# Patient Record
Sex: MALE | Employment: FULL TIME | URBAN - METROPOLITAN AREA
[De-identification: names, ages, dates, MRNs, and addresses within clinical notes are randomized per-mention and may not be internally consistent; named-entity substitution may affect disease eponyms.]

---

## 2017-07-07 ENCOUNTER — OFFICE VISIT (OUTPATIENT)
Dept: INTERNAL MEDICINE CLINIC | Age: 47
End: 2017-07-07

## 2017-07-07 VITALS
BODY MASS INDEX: 28.28 KG/M2 | HEIGHT: 67 IN | TEMPERATURE: 98.3 F | RESPIRATION RATE: 16 BRPM | SYSTOLIC BLOOD PRESSURE: 136 MMHG | WEIGHT: 180.2 LBS | HEART RATE: 96 BPM | DIASTOLIC BLOOD PRESSURE: 82 MMHG | OXYGEN SATURATION: 99 %

## 2017-07-07 DIAGNOSIS — E78.5 HYPERLIPIDEMIA, UNSPECIFIED HYPERLIPIDEMIA TYPE: ICD-10-CM

## 2017-07-07 DIAGNOSIS — Z80.42 FAMILY HISTORY OF PROSTATE CANCER: ICD-10-CM

## 2017-07-07 DIAGNOSIS — I10 WHITE COAT SYNDROME WITH HYPERTENSION: ICD-10-CM

## 2017-07-07 DIAGNOSIS — R73.02 IGT (IMPAIRED GLUCOSE TOLERANCE): ICD-10-CM

## 2017-07-07 DIAGNOSIS — G47.9 SLEEP DISTURBANCE: ICD-10-CM

## 2017-07-07 DIAGNOSIS — I10 ESSENTIAL HYPERTENSION: ICD-10-CM

## 2017-07-07 DIAGNOSIS — G47.33 OSA (OBSTRUCTIVE SLEEP APNEA): ICD-10-CM

## 2017-07-07 DIAGNOSIS — Z00.00 ROUTINE MEDICAL EXAM: Primary | ICD-10-CM

## 2017-07-07 LAB — HBA1C MFR BLD HPLC: 5.4 % (ref 4.8–5.6)

## 2017-07-07 RX ORDER — OLMESARTAN MEDOXOMIL 20 MG/1
20 TABLET ORAL DAILY
Qty: 30 TAB | Refills: 5 | Status: SHIPPED | OUTPATIENT
Start: 2017-07-07 | End: 2017-08-07 | Stop reason: SDUPTHER

## 2017-07-07 NOTE — PROGRESS NOTES
Rm 13    Chief Complaint   Patient presents with    Physical     1. Have you been to the ER, urgent care clinic since your last visit? Hospitalized since your last visit? No    2. Have you seen or consulted any other health care providers outside of the [address] [place] [address] since your last visit? Include any pap smears or colon screening. No    There are no preventive care reminders to display for this patient.

## 2017-07-07 NOTE — PROGRESS NOTES
Subjective: Marco Quiñones is a 49 y.o. male presenting for his annual checkup. ROS:  Feeling well. No dyspnea or chest pain on exertion. No abdominal pain, change in bowel habits, black or bloody stools. No urinary tract or prostatic symptoms. No neurological complaints. Specific concerns today:   BP at home around 120/70   Taking losartan. Still concern re: sleep disturbance due to losartan    No other complaints    Past medical, Social, and Family history reviewed  Medications reviewed and updated. Objective:     Visit Vitals    /90 (BP 1 Location: Right arm, BP Patient Position: Sitting)    Pulse 96    Temp 98.3 °F (36.8 °C) (Oral)    Resp 16    Ht 5' 7\" (1.702 m)    Wt 180 lb 3.2 oz (81.7 kg)    SpO2 99%    BMI 28.22 kg/m2     The patient appears well, alert, oriented x 3, in no distress. ENT normal.  Neck supple. No adenopathy or thyromegaly. MEME. Lungs are clear, good air entry, no wheezes, rhonchi or rales. S1 and S2 normal, no murmurs, regular rate and rhythm. Abdomen is soft without tenderness, guarding, mass or organomegaly.  exam: deferred . Extremities show no edema, normal peripheral pulses. Neurological is normal without focal findings. Prior labs reviewed. Assessment/Plan:   healthy adult male  follow low fat diet, routine labs ordered, call if any problems. ICD-10-CM ICD-9-CM    1. Routine medical exam Z00.00 V70.0 CBC WITH AUTOMATED DIFF      LIPID PANEL      METABOLIC PANEL, COMPREHENSIVE   2. IGT (impaired glucose tolerance) R73.02 790.22 AMB POC HEMOGLOBIN A1C   3. Sleep disturbance G47.9 780.50    4. Essential hypertension I10 401.9 olmesartan (BENICAR) 20 mg tablet   5. Hyperlipidemia, unspecified hyperlipidemia type E78.5 272.4    6. ANA (obstructive sleep apnea) G47.33 327.23    7. White coat syndrome with hypertension I10 401.9    8.  Family history of prostate cancer Z80.42 V16.42      Follow-up Disposition:  Return in about 2 months (around 9/7/2017), or if symptoms worsen or fail to improve, for blood pressure. results and schedule of future studies reviewed with patient  reviewed diet, exercise and weight   cardiovascular risk and specific lipid/LDL goals reviewed  reviewed medications and side effects in detail.   Change to another ARB - benicar Rx'd  Recheck labs

## 2017-07-07 NOTE — MR AVS SNAPSHOT
Visit Information Date & Time Provider Department Dept. Phone Encounter #  
 7/7/2017  9:00 AM Nellie Carrion [place] [address] and Internal Medicine [phone] [id number] Follow-up Instructions Return in about 2 months (around 9/7/2017), or if symptoms worsen or fail to improve, for blood pressure. Upcoming Health Maintenance Date Due INFLUENZA AGE 9 TO ADULT 8/1/2017 DTaP/Tdap/Td series (2 - Td) 10/24/2026 Allergies as of 7/7/2017  Review Complete On: 7/7/2017 By: Alonzo Ochoa MD  
  
 Severity Noted Reaction Type Reactions Lisinopril  10/09/2015    Cough Current Immunizations  Reviewed on 7/7/2017 Name Date Influenza Vaccine (Quad) PF 10/24/2016, 10/9/2015 Tdap 10/24/2016 Reviewed by Alonzo Ochoa MD on 7/7/2017 at 10:29 AM  
You Were Diagnosed With   
  
 Codes Comments Routine medical exam    -  Primary ICD-10-CM: Z00.00 ICD-9-CM: V70.0 IGT (impaired glucose tolerance)     ICD-10-CM: R73.02 
ICD-9-CM: 790.22 Sleep disturbance     ICD-10-CM: G47.9 ICD-9-CM: 780.50 Essential hypertension     ICD-10-CM: I10 
ICD-9-CM: 401.9 Hyperlipidemia, unspecified hyperlipidemia type     ICD-10-CM: E78.5 ICD-9-CM: 272.4   
 ANA (obstructive sleep apnea)     ICD-10-CM: G47.33 
ICD-9-CM: 327.23 White coat syndrome with hypertension     ICD-10-CM: I10 
ICD-9-CM: 401.9 Family history of prostate cancer     ICD-10-CM: Z80.42 
ICD-9-CM: V16.42 Vitals BP Pulse Temp Resp Height(growth percentile) Weight(growth percentile) 136/82 96 98.3 °F (36.8 °C) (Oral) 16 5' 7\" (1.702 m) 180 lb 3.2 oz (81.7 kg) SpO2 BMI Smoking Status 99% 28.22 kg/m2 Never Smoker Vitals History BMI and BSA Data Body Mass Index Body Surface Area  
 28.22 kg/m 2 1.97 m 2 Preferred Pharmacy Pharmacy Name Phone  [id number] [address] [phone] Your Updated Medication List  
  
   
This list is accurate as of: 7/7/17 10:36 AM.  Always use your most recent med list.  
  
  
  
  
 loratadine 10 mg tablet Commonly known as:  Curlee Arms Take 1 Tab by mouth daily. multivitamin tablet Commonly known as:  ONE A DAY Take 1 Tab by mouth daily. olmesartan 20 mg tablet Commonly known as:  Limited Brands Take 1 Tab by mouth daily. VITAMIN C 250 mg tablet Generic drug:  ascorbic acid (vitamin C) Take  by mouth. Prescriptions Sent to Pharmacy Refills  
 olmesartan (BENICAR) 20 mg tablet 5 Sig: Take 1 Tab by mouth daily. Class: Normal  
 [facility], [address] [place] [address] #: [phone] Route: Oral  
  
We Performed the Following AMB POC HEMOGLOBIN A1C [67863 CPT(R)] CBC WITH AUTOMATED DIFF [41753 CPT(R)] LIPID PANEL [29966 CPT(R)] METABOLIC PANEL, COMPREHENSIVE [36606 CPT(R)] Follow-up Instructions Return in about 2 months (around 9/7/2017), or if symptoms worsen or fail to improve, for blood pressure. Introducing [facility]! Dear Santiago Hurt: Thank you for requesting a [employer] account. Our records indicate that you already have an active [employer] account. You can access your account anytime at [email or web address] Did you know that you can access your hospital and ER discharge instructions at any time in [employer]? You can also review all of your test results from your hospital stay or ER visit. Additional Information If you have questions, please visit the Frequently Asked Questions section of the [employer] website at [email or web address] Remember, [employer] is NOT to be used for urgent needs. For medical emergencies, dial 911. Now available from your iPhone and Android! Please provide this summary of care documentation to your next provider. Your primary care clinician is listed as [address] If you have any questions after today's visit, please call [phone].

## 2017-07-08 LAB
ALBUMIN SERPL-MCNC: 4.4 G/DL (ref 3.5–5.5)
ALBUMIN/GLOB SERPL: 1.6 {RATIO} (ref 1.2–2.2)
ALP SERPL-CCNC: 63 IU/L (ref 39–117)
ALT SERPL-CCNC: 41 IU/L (ref 0–44)
AST SERPL-CCNC: 21 IU/L (ref 0–40)
BASOPHILS # BLD AUTO: 0 X10E3/UL (ref 0–0.2)
BASOPHILS NFR BLD AUTO: 0 %
BILIRUB SERPL-MCNC: 0.3 MG/DL (ref 0–1.2)
BUN SERPL-MCNC: 12 MG/DL (ref 6–24)
BUN/CREAT SERPL: 12 (ref 9–20)
CALCIUM SERPL-MCNC: 9.2 MG/DL (ref 8.7–10.2)
CHLORIDE SERPL-SCNC: 100 MMOL/L (ref 96–106)
CHOLEST SERPL-MCNC: 165 MG/DL (ref 100–199)
CO2 SERPL-SCNC: 27 MMOL/L (ref 18–29)
CREAT SERPL-MCNC: 1 MG/DL (ref 0.76–1.27)
EOSINOPHIL # BLD AUTO: 0.1 X10E3/UL (ref 0–0.4)
EOSINOPHIL NFR BLD AUTO: 1 %
ERYTHROCYTE [DISTWIDTH] IN BLOOD BY AUTOMATED COUNT: 14 % (ref 12.3–15.4)
GLOBULIN SER CALC-MCNC: 2.8 G/DL (ref 1.5–4.5)
GLUCOSE SERPL-MCNC: 76 MG/DL (ref 65–99)
HCT VFR BLD AUTO: 43.1 % (ref 37.5–51)
HDLC SERPL-MCNC: 50 MG/DL
HGB BLD-MCNC: 13.6 G/DL (ref 12.6–17.7)
IMM GRANULOCYTES # BLD: 0 X10E3/UL (ref 0–0.1)
IMM GRANULOCYTES NFR BLD: 0 %
LDLC SERPL CALC-MCNC: 100 MG/DL (ref 0–99)
LYMPHOCYTES # BLD AUTO: 1.9 X10E3/UL (ref 0.7–3.1)
LYMPHOCYTES NFR BLD AUTO: 19 %
MCH RBC QN AUTO: 25.5 PG (ref 26.6–33)
MCHC RBC AUTO-ENTMCNC: 31.6 G/DL (ref 31.5–35.7)
MCV RBC AUTO: 81 FL (ref 79–97)
MONOCYTES # BLD AUTO: 0.7 X10E3/UL (ref 0.1–0.9)
MONOCYTES NFR BLD AUTO: 7 %
NEUTROPHILS # BLD AUTO: 7.3 X10E3/UL (ref 1.4–7)
NEUTROPHILS NFR BLD AUTO: 73 %
PLATELET # BLD AUTO: 237 X10E3/UL (ref 150–379)
POTASSIUM SERPL-SCNC: 4.6 MMOL/L (ref 3.5–5.2)
PROT SERPL-MCNC: 7.2 G/DL (ref 6–8.5)
RBC # BLD AUTO: 5.34 X10E6/UL (ref 4.14–5.8)
SODIUM SERPL-SCNC: 141 MMOL/L (ref 134–144)
TRIGL SERPL-MCNC: 77 MG/DL (ref 0–149)
VLDLC SERPL CALC-MCNC: 15 MG/DL (ref 5–40)
WBC # BLD AUTO: 10.1 X10E3/UL (ref 3.4–10.8)

## 2018-01-10 ENCOUNTER — OFFICE VISIT (OUTPATIENT)
Dept: INTERNAL MEDICINE CLINIC | Age: 48
End: 2018-01-10

## 2018-01-10 VITALS
WEIGHT: 180.4 LBS | DIASTOLIC BLOOD PRESSURE: 89 MMHG | HEART RATE: 88 BPM | RESPIRATION RATE: 16 BRPM | OXYGEN SATURATION: 99 % | SYSTOLIC BLOOD PRESSURE: 134 MMHG | BODY MASS INDEX: 28.31 KG/M2 | HEIGHT: 67 IN | TEMPERATURE: 98.9 F

## 2018-01-10 DIAGNOSIS — Z80.42 FAMILY HISTORY OF PROSTATE CANCER: ICD-10-CM

## 2018-01-10 DIAGNOSIS — E78.5 HYPERLIPIDEMIA, UNSPECIFIED HYPERLIPIDEMIA TYPE: ICD-10-CM

## 2018-01-10 DIAGNOSIS — I10 ESSENTIAL HYPERTENSION: Primary | ICD-10-CM

## 2018-01-10 DIAGNOSIS — Z23 ENCOUNTER FOR IMMUNIZATION: ICD-10-CM

## 2018-01-10 DIAGNOSIS — G47.33 OSA (OBSTRUCTIVE SLEEP APNEA): ICD-10-CM

## 2018-01-10 DIAGNOSIS — I10 WHITE COAT SYNDROME WITH HYPERTENSION: ICD-10-CM

## 2018-01-10 DIAGNOSIS — R73.02 IGT (IMPAIRED GLUCOSE TOLERANCE): ICD-10-CM

## 2018-01-10 RX ORDER — AMLODIPINE BESYLATE 5 MG/1
5 TABLET ORAL DAILY
Qty: 90 TAB | Refills: 1 | Status: SHIPPED | OUTPATIENT
Start: 2018-01-10 | End: 2018-10-02 | Stop reason: SDUPTHER

## 2018-01-10 NOTE — PROGRESS NOTES
HISTORY OF PRESENT ILLNESS  Nick Toribio is a 49 y.o. male. HPI  Presents for f/u HTN, sleep     Pt has noticed difficulty sleeping on benicar  Sleeping improved when med held and difficulty recurred when resumed med  Also had trouble with sleep on losartan as well    No CP, SOB, neuro sx    Past medical, Social, and Family history reviewed  Medications reviewed and updated. ROS  Complete ROS reviewed and negative or stable except as noted in HPI. Physical Exam   Constitutional: He is oriented to person, place, and time. He appears well-nourished. No distress. HENT:   Head: Normocephalic and atraumatic. Mouth/Throat: Oropharynx is clear and moist. No oropharyngeal exudate. Eyes: EOM are normal. Pupils are equal, round, and reactive to light. No scleral icterus. Neck: Normal range of motion. Neck supple. No JVD present. No thyromegaly present. Cardiovascular: Normal rate, regular rhythm and normal heart sounds. Exam reveals no gallop and no friction rub. No murmur heard. Pulmonary/Chest: Effort normal and breath sounds normal. No respiratory distress. He has no wheezes. He has no rales. Abdominal: Soft. Bowel sounds are normal. He exhibits no distension. There is no tenderness. Musculoskeletal: Normal range of motion. He exhibits no edema. Lymphadenopathy:     He has no cervical adenopathy. Neurological: He is alert and oriented to person, place, and time. He exhibits normal muscle tone. Coordination normal.   Skin: Skin is warm. No rash noted. Psychiatric: He has a normal mood and affect. Nursing note and vitals reviewed. Prior labs reviewed. ASSESSMENT and PLAN    ICD-10-CM ICD-9-CM    1. Essential hypertension I10 401.9 CBC WITH AUTOMATED DIFF      METABOLIC PANEL, COMPREHENSIVE      amLODIPine (NORVASC) 5 mg tablet   2. Family history of prostate cancer Z80.42 V16.42 PSA, DIAGNOSTIC (PROSTATE SPECIFIC AG)   3. White coat syndrome with hypertension I10 401.9    4.  ANA (obstructive sleep apnea) G47.33 327.23    5. IGT (impaired glucose tolerance) R73.02 790.22 HEMOGLOBIN A1C WITH EAG   6. Hyperlipidemia, unspecified hyperlipidemia type X02.7 191.5 METABOLIC PANEL, COMPREHENSIVE   7. Encounter for immunization Z23 V03.89 INFLUENZA VIRUS VAC QUAD,SPLIT,PRESV FREE SYRINGE IM     Follow-up Disposition:  Return in about 3 weeks (around 1/31/2018), or if symptoms worsen or fail to improve, for blood pressure.   results and schedule of future studies reviewed with patient  reviewed diet, exercise and weight   cardiovascular risk and specific lipid/LDL goals reviewed  reviewed medications and side effects in detail   Dc benicar  Start norvasc at 5mg  Flu shot  Check lytes, PSA

## 2018-01-10 NOTE — PROGRESS NOTES
Rm 14    Chief Complaint   Patient presents with    Medication Evaluation    Sleep Problem     pt states BP medication is affecting his sleep    Blood Pressure Check     pt states BP has been elevated when monitoring at home     1. Have you been to the ER, urgent care clinic since your last visit? Hospitalized since your last visit? No    2. Have you seen or consulted any other health care providers outside of the [address] [place] [address] since your last visit? Include any pap smears or colon screening.  No    Health Maintenance Due   Topic Date Due    Influenza Age 5 to Adult  08/01/2017   pt declines flu vaccine today    PHQ over the last two weeks 10/9/2015   Little interest or pleasure in doing things Not at all   Feeling down, depressed or hopeless Not at all   Total Score PHQ 2 0

## 2018-01-10 NOTE — MR AVS SNAPSHOT
Visit Information Date & Time Provider Department Dept. Phone Encounter #  
 1/10/2018  2:30 PM Nate Suresh MD [address] [place] and Internal Medicine [phone] [id number] Follow-up Instructions Return in about 3 weeks (around 1/31/2018), or if symptoms worsen or fail to improve, for blood pressure. Upcoming Health Maintenance Date Due Influenza Age 5 to Adult 8/1/2017 DTaP/Tdap/Td series (2 - Td) 10/24/2026 Allergies as of 1/10/2018  Review Complete On: 1/10/2018 By: Nate Suresh MD  
  
 Severity Noted Reaction Type Reactions Lisinopril  10/09/2015    Cough Current Immunizations  Reviewed on 1/10/2018 Name Date Influenza Vaccine (Quad) PF  Incomplete, 10/24/2016, 10/9/2015 Tdap 10/24/2016 Reviewed by Nate Suresh MD on 1/10/2018 at  3:09 PM  
You Were Diagnosed With   
  
 Codes Comments Essential hypertension    -  Primary ICD-10-CM: I10 
ICD-9-CM: 401.9 Family history of prostate cancer     ICD-10-CM: Z80.42 
ICD-9-CM: V16.42 White coat syndrome with hypertension     ICD-10-CM: I10 
ICD-9-CM: 401.9 ANA (obstructive sleep apnea)     ICD-10-CM: G47.33 
ICD-9-CM: 327.23 IGT (impaired glucose tolerance)     ICD-10-CM: R73.02 
ICD-9-CM: 790.22 Hyperlipidemia, unspecified hyperlipidemia type     ICD-10-CM: E78.5 ICD-9-CM: 272.4 Encounter for immunization     ICD-10-CM: R13 ICD-9-CM: V03.89 Vitals BP Pulse Temp Resp Height(growth percentile) Weight(growth percentile) 134/89 (BP 1 Location: Left arm, BP Patient Position: Sitting) 88 98.9 °F (37.2 °C) (Oral) 16 5' 7\" (1.702 m) 180 lb 6.4 oz (81.8 kg) SpO2 BMI Smoking Status 99% 28.25 kg/m2 Never Smoker Vitals History BMI and BSA Data Body Mass Index Body Surface Area  
 28.25 kg/m 2 1.97 m 2 Preferred Pharmacy Pharmacy Name Phone [id number] Barbara Signs, [address] [place] [address] [phone] Your Updated Medication List  
  
   
This list is accurate as of: 1/10/18  3:12 PM.  Always use your most recent med list. amLODIPine 5 mg tablet Commonly known as:  Robbi Silva Take 1 Tab by mouth daily. loratadine 10 mg tablet Commonly known as:  Michaelkalpesh Haven Take 1 Tab by mouth daily. multivitamin tablet Commonly known as:  ONE A DAY Take 1 Tab by mouth daily. olmesartan 20 mg tablet Commonly known as:  [employer] Take 1 Tab by mouth daily. VITAMIN C 250 mg tablet Generic drug:  ascorbic acid (vitamin C) Take  by mouth. Prescriptions Sent to Pharmacy Refills  
 amLODIPine (NORVASC) 5 mg tablet 1 Sig: Take 1 Tab by mouth daily. Class: Normal  
 Pharmacy: [facility] [address], [address] [place] [address] Ph #: [phone] Route: Oral  
  
We Performed the Following CBC WITH AUTOMATED DIFF [73418 CPT(R)] HEMOGLOBIN A1C WITH EAG [10516 CPT(R)] INFLUENZA VIRUS VAC QUAD,SPLIT,PRESV FREE SYRINGE IM [id number] CPT(R)] METABOLIC PANEL, COMPREHENSIVE [76241 CPT(R)] PSA, DIAGNOSTIC (PROSTATE SPECIFIC AG) [id number] CPT(R)] Follow-up Instructions Return in about 3 weeks (around 1/31/2018), or if symptoms worsen or fail to improve, for blood pressure. Introducing [facility]! Dear Jose Luis Arce: Thank you for requesting a [employer] account. Our records indicate that you already have an active [employer] account. You can access your account anytime at [email or web address] Did you know that you can access your hospital and ER discharge instructions at any time in Gope[employer]? You can also review all of your test results from your hospital stay or ER visit. Additional Information If you have questions, please visit the Frequently Asked Questions section of the [employer] website at [email or web address] Remember, [employer] is NOT to be used for urgent needs. For medical emergencies, dial 911. Now available from your iPhone and Android! Please provide this summary of care documentation to your next provider. Your primary care clinician is listed as [address] If you have any questions after today's visit, please call [phone].

## 2018-01-11 LAB
ALBUMIN SERPL-MCNC: 4.5 G/DL (ref 3.5–5.5)
ALBUMIN/GLOB SERPL: 1.6 {RATIO} (ref 1.2–2.2)
ALP SERPL-CCNC: 57 IU/L (ref 39–117)
ALT SERPL-CCNC: 25 IU/L (ref 0–44)
AST SERPL-CCNC: 23 IU/L (ref 0–40)
BASOPHILS # BLD AUTO: 0 X10E3/UL (ref 0–0.2)
BASOPHILS NFR BLD AUTO: 0 %
BILIRUB SERPL-MCNC: <0.2 MG/DL (ref 0–1.2)
BUN SERPL-MCNC: 15 MG/DL (ref 6–24)
BUN/CREAT SERPL: 14 (ref 9–20)
CALCIUM SERPL-MCNC: 9.2 MG/DL (ref 8.7–10.2)
CHLORIDE SERPL-SCNC: 98 MMOL/L (ref 96–106)
CO2 SERPL-SCNC: 27 MMOL/L (ref 18–29)
CREAT SERPL-MCNC: 1.04 MG/DL (ref 0.76–1.27)
EOSINOPHIL # BLD AUTO: 0.1 X10E3/UL (ref 0–0.4)
EOSINOPHIL NFR BLD AUTO: 1 %
ERYTHROCYTE [DISTWIDTH] IN BLOOD BY AUTOMATED COUNT: 13.8 % (ref 12.3–15.4)
EST. AVERAGE GLUCOSE BLD GHB EST-MCNC: 111 MG/DL
GLOBULIN SER CALC-MCNC: 2.8 G/DL (ref 1.5–4.5)
GLUCOSE SERPL-MCNC: 91 MG/DL (ref 65–99)
HBA1C MFR BLD: 5.5 % (ref 4.8–5.6)
HCT VFR BLD AUTO: 42.7 % (ref 37.5–51)
HGB BLD-MCNC: 13.2 G/DL (ref 13–17.7)
IMM GRANULOCYTES # BLD: 0 X10E3/UL (ref 0–0.1)
IMM GRANULOCYTES NFR BLD: 0 %
LYMPHOCYTES # BLD AUTO: 2.9 X10E3/UL (ref 0.7–3.1)
LYMPHOCYTES NFR BLD AUTO: 29 %
MCH RBC QN AUTO: 25.4 PG (ref 26.6–33)
MCHC RBC AUTO-ENTMCNC: 30.9 G/DL (ref 31.5–35.7)
MCV RBC AUTO: 82 FL (ref 79–97)
MONOCYTES # BLD AUTO: 0.8 X10E3/UL (ref 0.1–0.9)
MONOCYTES NFR BLD AUTO: 8 %
NEUTROPHILS # BLD AUTO: 6.2 X10E3/UL (ref 1.4–7)
NEUTROPHILS NFR BLD AUTO: 62 %
PLATELET # BLD AUTO: 232 X10E3/UL (ref 150–379)
POTASSIUM SERPL-SCNC: 4.4 MMOL/L (ref 3.5–5.2)
PROT SERPL-MCNC: 7.3 G/DL (ref 6–8.5)
PSA SERPL-MCNC: 1.8 NG/ML (ref 0–4)
RBC # BLD AUTO: 5.19 X10E6/UL (ref 4.14–5.8)
SODIUM SERPL-SCNC: 139 MMOL/L (ref 134–144)
WBC # BLD AUTO: 10.1 X10E3/UL (ref 3.4–10.8)

## 2018-03-23 ENCOUNTER — OFFICE VISIT (OUTPATIENT)
Dept: INTERNAL MEDICINE CLINIC | Age: 48
End: 2018-03-23

## 2018-03-23 VITALS
HEART RATE: 88 BPM | DIASTOLIC BLOOD PRESSURE: 82 MMHG | BODY MASS INDEX: 28.35 KG/M2 | SYSTOLIC BLOOD PRESSURE: 113 MMHG | WEIGHT: 180.6 LBS | OXYGEN SATURATION: 99 % | RESPIRATION RATE: 16 BRPM | HEIGHT: 67 IN | TEMPERATURE: 98.5 F

## 2018-03-23 DIAGNOSIS — R73.02 IGT (IMPAIRED GLUCOSE TOLERANCE): ICD-10-CM

## 2018-03-23 DIAGNOSIS — I10 ESSENTIAL HYPERTENSION: Primary | ICD-10-CM

## 2018-03-23 DIAGNOSIS — E78.5 HYPERLIPIDEMIA, UNSPECIFIED HYPERLIPIDEMIA TYPE: ICD-10-CM

## 2018-03-23 NOTE — PROGRESS NOTES
Snehal Friend is a 49 y.o. male    Room 15    Chief Complaint   Patient presents with    Hypertension     3 week follow up       1. Have you been to the ER, urgent care clinic since your last visit? Hospitalized since your last visit? No    2. Have you seen or consulted any other health care providers outside of the [address] [place] [address] since your last visit? Include any pap smears or colon screening.  No    Health Maintenance Due   Topic Date Due    Influenza Age 5 to Adult  08/01/2017

## 2018-03-23 NOTE — PROGRESS NOTES
HPI:  Presents for f/u HTN    Tolerating norvasc    +/- increased urinary frequency  No nocturia though. No CP, SOB, neuro sx       Past medical, Social, and Family history reviewed    Prior to Admission medications    Medication Sig Start Date End Date Taking? Authorizing Provider   amLODIPine (NORVASC) 5 mg tablet Take 1 Tab by mouth daily. 1/10/18  Yes Tuan Malcolm MD   multivitamin (ONE A DAY) tablet Take 1 Tab by mouth daily. Yes Historical Provider   olmesartan (BENICAR) 20 mg tablet Take 1 Tab by mouth daily. 8/8/17   Tuan Malcolm MD   loratadine (CLARITIN) 10 mg tablet Take 1 Tab by mouth daily. 3/18/16   Tuan Malcolm MD   ascorbic acid (VITAMIN C) 250 mg tablet Take  by mouth. Historical Provider          ROS  Complete ROS reviewed and negative or stable except as noted in HPI. Physical Exam   Constitutional: He is oriented to person, place, and time. He appears well-nourished. No distress. HENT:   Head: Normocephalic and atraumatic. Mouth/Throat: Oropharynx is clear and moist. No oropharyngeal exudate. Eyes: EOM are normal. Pupils are equal, round, and reactive to light. No scleral icterus. Neck: Normal range of motion. Neck supple. No JVD present. No thyromegaly present. Cardiovascular: Normal rate, regular rhythm and normal heart sounds. Exam reveals no gallop and no friction rub. No murmur heard. Pulmonary/Chest: Effort normal and breath sounds normal. No respiratory distress. He has no wheezes. He has no rales. Abdominal: Soft. Bowel sounds are normal. He exhibits no distension. There is no tenderness. Musculoskeletal: Normal range of motion. He exhibits no edema. Lymphadenopathy:     He has no cervical adenopathy. Neurological: He is alert and oriented to person, place, and time. He exhibits normal muscle tone. Coordination normal.   Skin: Skin is warm. No rash noted. Psychiatric: He has a normal mood and affect.    Nursing note and vitals reviewed. Prior labs reviewed. Assessment/Plan:    ICD-10-CM ICD-9-CM    1. Essential hypertension I10 401.9    2. IGT (impaired glucose tolerance) R73.02 790.22    3. Hyperlipidemia, unspecified hyperlipidemia type E78.5 272.4      Follow-up Disposition:  Return in about 6 months (around 9/23/2018), or if symptoms worsen or fail to improve, for blood pressure, cholesterol.   results and schedule of future studies reviewed with patient  reviewed diet, exercise and weight  cardiovascular risk and specific lipid/LDL goals reviewed  reviewed medications and side effects in detail  Continue current medications

## 2018-03-23 NOTE — MR AVS SNAPSHOT
[address] [facility] [place] [address] 
[phone] Patient: Isis Simms MRN: [id number] Visit Information Date & Time Provider Department Dept. Phone Encounter #  
 3/23/2018  3:30 PM Cherry Manley, [address] [place] [address] and Internal Medicine [phone] [id number] Follow-up Instructions Return in about 6 months (around 9/23/2018), or if symptoms worsen or fail to improve, for blood pressure, cholesterol. Upcoming Health Maintenance Date Due Influenza Age 5 to Adult 8/1/2017 DTaP/Tdap/Td series (2 - Td) 10/24/2026 Allergies as of 3/23/2018  Review Complete On: 3/23/2018 By: Ying Reddy LPN Severity Noted Reaction Type Reactions Lisinopril  10/09/2015    Cough Losartan  03/23/2018    Other (comments) Sleep disturbance Current Immunizations  Reviewed on 1/10/2018 Name Date Influenza Vaccine (Quad) PF  Deferred (Medication not available), 10/24/2016, 10/9/2015 Tdap 10/24/2016 Not reviewed this visit You Were Diagnosed With   
  
 Codes Comments Essential hypertension    -  Primary ICD-10-CM: I10 
ICD-9-CM: 401.9 IGT (impaired glucose tolerance)     ICD-10-CM: R73.02 
ICD-9-CM: 790.22 Hyperlipidemia, unspecified hyperlipidemia type     ICD-10-CM: E78.5 ICD-9-CM: 272.4 Vitals BP Pulse Temp Resp Height(growth percentile) Weight(growth percentile) 113/82 88 98.5 °F (36.9 °C) (Oral) 16 5' 7\" (1.702 m) 180 lb 9.6 oz (81.9 kg) SpO2 BMI Smoking Status 99% 28.29 kg/m2 Never Smoker BMI and BSA Data Body Mass Index Body Surface Area  
 28.29 kg/m 2 1.97 m 2 Preferred Pharmacy [facility] Name Phone [id number] Yulisa Richardsonjovana, [address] [facility] [phone] Your Updated Medication List  
  
   
 This list is accurate as of 3/23/18  4:06 PM.  Always use your most recent med list. amLODIPine 5 mg tablet Commonly known as:  Ruba García Take 1 Tab by mouth daily. loratadine 10 mg tablet Commonly known as:  Thermon Marker Take 1 Tab by mouth daily. multivitamin tablet Commonly known as:  ONE A DAY Take 1 Tab by mouth daily. VITAMIN C 250 mg tablet Generic drug:  ascorbic acid (vitamin C) Take  by mouth. Follow-up Instructions Return in about 6 months (around 9/23/2018), or if symptoms worsen or fail to improve, for blood pressure, cholesterol. Introducing [address]! Dear Emmanuel Gonsalez: Thank you for requesting a PPI account. Our records indicate that you already have an active PPI account. You can access your account anytime at [email or web address] Did you know that you can access your hospital and ER discharge instructions at any time in PPI? You can also review all of your test results from your hospital stay or ER visit. Additional Information If you have questions, please visit the Frequently Asked Questions section of the PPI website at [email or web address] Remember, PPI is NOT to be used for urgent needs. For medical emergencies, dial 911. Now available from your iPhone and Android! Please provide this summary of care documentation to your next provider. Your primary care clinician is listed as [address] If you have any questions after today's visit, please call [phone].

## 2018-11-29 ENCOUNTER — OFFICE VISIT (OUTPATIENT)
Dept: INTERNAL MEDICINE CLINIC | Age: 48
End: 2018-11-29

## 2018-11-29 VITALS
HEIGHT: 67 IN | TEMPERATURE: 98.2 F | OXYGEN SATURATION: 98 % | WEIGHT: 187.4 LBS | HEART RATE: 82 BPM | RESPIRATION RATE: 16 BRPM | DIASTOLIC BLOOD PRESSURE: 86 MMHG | BODY MASS INDEX: 29.41 KG/M2 | SYSTOLIC BLOOD PRESSURE: 134 MMHG

## 2018-11-29 DIAGNOSIS — E78.5 HYPERLIPIDEMIA, UNSPECIFIED HYPERLIPIDEMIA TYPE: ICD-10-CM

## 2018-11-29 DIAGNOSIS — G47.33 OSA (OBSTRUCTIVE SLEEP APNEA): ICD-10-CM

## 2018-11-29 DIAGNOSIS — R73.02 IGT (IMPAIRED GLUCOSE TOLERANCE): ICD-10-CM

## 2018-11-29 DIAGNOSIS — I10 WHITE COAT SYNDROME WITH HYPERTENSION: ICD-10-CM

## 2018-11-29 DIAGNOSIS — Z00.00 WELL ADULT EXAM: Primary | ICD-10-CM

## 2018-11-29 DIAGNOSIS — I10 ESSENTIAL HYPERTENSION: ICD-10-CM

## 2018-11-29 NOTE — PROGRESS NOTES
Rm 13 Chief Complaint Patient presents with  Blood Pressure Check  Cholesterol Problem  
pt is not fasting 1. Have you been to the ER, urgent care clinic since your last visit? Hospitalized since your last visit? No 
 
2. Have you seen or consulted any other health care providers outside of the [address] [place] [address] since your last visit? Include any pap smears or colon screening. No 
 
Health Maintenance Due Topic Date Due  Influenza Age 5 to Adult  08/01/2018  
pt declined flu vaccine PHQ over the last two weeks 3/23/2018 Little interest or pleasure in doing things Not at all Feeling down, depressed, irritable, or hopeless Not at all Total Score PHQ 2 0 Learning Assessment 1/10/2018 PRIMARY LEARNER Patient HIGHEST LEVEL OF EDUCATION - PRIMARY LEARNER  4 YEARS OF COLLEGE  
BARRIERS PRIMARY LEARNER NONE  
CO-LEARNER CAREGIVER No  
PRIMARY LANGUAGE ENGLISH  
LEARNER PREFERENCE PRIMARY VIDEOS  
ANSWERED BY patient RELATIONSHIP SELF

## 2018-11-29 NOTE — PROGRESS NOTES
HPI: 
Presents for a physical, annual visit. No acute complaints Taking and tolerating norvasc BP at home 120-130's/80-85 No CP, SOB, neuro sx Past medical, Social, and Family history reviewed Prior to Admission medications Medication Sig Start Date End Date Taking? Authorizing Provider  
multivitamin (ONE A DAY) tablet Take 1 Tab by mouth daily. Yes Provider, Historical  
loratadine (CLARITIN) 10 mg tablet Take 1 Tab by mouth daily. 3/18/16  Yes Niyah Machado MD  
ascorbic acid (VITAMIN C) 250 mg tablet Take  by mouth. Yes Provider, Historical  
amLODIPine (NORVASC) 5 mg tablet Take 1 Tab by mouth daily. 1/3/19   Niyah Machado MD  
  
 
 
ROS Complete ROS reviewed and negative or stable except as noted in HPI. Physical Exam  
Constitutional: He is oriented to person, place, and time. He appears well-nourished. No distress. HENT:  
Head: Normocephalic and atraumatic. Mouth/Throat: Oropharynx is clear and moist. No oropharyngeal exudate. Eyes: EOM are normal. Pupils are equal, round, and reactive to light. No scleral icterus. Neck: Normal range of motion. Neck supple. No JVD present. No thyromegaly present. Cardiovascular: Normal rate, regular rhythm and normal heart sounds. Exam reveals no gallop and no friction rub. No murmur heard. Pulmonary/Chest: Effort normal and breath sounds normal. No respiratory distress. He has no wheezes. He has no rales. Abdominal: Soft. Bowel sounds are normal. He exhibits no distension. There is no tenderness. Musculoskeletal: Normal range of motion. He exhibits no edema. Lymphadenopathy:  
  He has no cervical adenopathy. Neurological: He is alert and oriented to person, place, and time. He exhibits normal muscle tone. Coordination normal.  
Skin: Skin is warm. No rash noted. Psychiatric: He has a normal mood and affect. Nursing note and vitals reviewed. Prior labs reviewed. Health maintenance rec's reviewed. Assessment/Plan: ICD-10-CM ICD-9-CM 1. Well adult exam Z00.00 V70.0 2. White coat syndrome with hypertension I10 401.9 3. ANA (obstructive sleep apnea) G47.33 327.23   
4. Essential hypertension I10 401.9 CBC WITH AUTOMATED DIFF 5. Hyperlipidemia, unspecified hyperlipidemia type E78.5 272.4 LIPID PANEL  
   METABOLIC PANEL, COMPREHENSIVE 6. IGT (impaired glucose tolerance) R73.02 790.22 HEMOGLOBIN A1C WITH EAG Follow-up Disposition: 
Return in about 4 months (around 3/29/2019), or if symptoms worsen or fail to improve, for blood pressure. results and schedule of future studies reviewed with patient 
reviewed diet, exercise and weight  
cardiovascular risk and specific lipid/LDL goals reviewed 
reviewed medications and side effects in detail Continue current medications Work on [employer]

## 2018-12-06 LAB
ALBUMIN SERPL-MCNC: 4.6 G/DL (ref 3.5–5.5)
ALBUMIN/GLOB SERPL: 1.6 {RATIO} (ref 1.2–2.2)
ALP SERPL-CCNC: 61 IU/L (ref 39–117)
ALT SERPL-CCNC: 38 IU/L (ref 0–44)
AST SERPL-CCNC: 20 IU/L (ref 0–40)
BASOPHILS # BLD AUTO: 0 X10E3/UL (ref 0–0.2)
BASOPHILS NFR BLD AUTO: 0 %
BILIRUB SERPL-MCNC: 0.3 MG/DL (ref 0–1.2)
BUN SERPL-MCNC: 16 MG/DL (ref 6–24)
BUN/CREAT SERPL: 14 (ref 9–20)
CALCIUM SERPL-MCNC: 9 MG/DL (ref 8.7–10.2)
CHLORIDE SERPL-SCNC: 103 MMOL/L (ref 96–106)
CHOLEST SERPL-MCNC: 170 MG/DL (ref 100–199)
CO2 SERPL-SCNC: 25 MMOL/L (ref 20–29)
CREAT SERPL-MCNC: 1.12 MG/DL (ref 0.76–1.27)
EOSINOPHIL # BLD AUTO: 0.2 X10E3/UL (ref 0–0.4)
EOSINOPHIL NFR BLD AUTO: 2 %
ERYTHROCYTE [DISTWIDTH] IN BLOOD BY AUTOMATED COUNT: 13.7 % (ref 12.3–15.4)
EST. AVERAGE GLUCOSE BLD GHB EST-MCNC: 120 MG/DL
GLOBULIN SER CALC-MCNC: 2.8 G/DL (ref 1.5–4.5)
GLUCOSE SERPL-MCNC: 89 MG/DL (ref 65–99)
HBA1C MFR BLD: 5.8 % (ref 4.8–5.6)
HCT VFR BLD AUTO: 42.4 % (ref 37.5–51)
HDLC SERPL-MCNC: 51 MG/DL
HGB BLD-MCNC: 13.9 G/DL (ref 13–17.7)
IMM GRANULOCYTES # BLD: 0 X10E3/UL (ref 0–0.1)
IMM GRANULOCYTES NFR BLD: 0 %
LDLC SERPL CALC-MCNC: 108 MG/DL (ref 0–99)
LYMPHOCYTES # BLD AUTO: 2.6 X10E3/UL (ref 0.7–3.1)
LYMPHOCYTES NFR BLD AUTO: 28 %
MCH RBC QN AUTO: 25.9 PG (ref 26.6–33)
MCHC RBC AUTO-ENTMCNC: 32.8 G/DL (ref 31.5–35.7)
MCV RBC AUTO: 79 FL (ref 79–97)
MONOCYTES # BLD AUTO: 0.8 X10E3/UL (ref 0.1–0.9)
MONOCYTES NFR BLD AUTO: 9 %
NEUTROPHILS # BLD AUTO: 5.8 X10E3/UL (ref 1.4–7)
NEUTROPHILS NFR BLD AUTO: 61 %
PLATELET # BLD AUTO: 234 X10E3/UL (ref 150–379)
POTASSIUM SERPL-SCNC: 4.3 MMOL/L (ref 3.5–5.2)
PROT SERPL-MCNC: 7.4 G/DL (ref 6–8.5)
RBC # BLD AUTO: 5.37 X10E6/UL (ref 4.14–5.8)
SODIUM SERPL-SCNC: 141 MMOL/L (ref 134–144)
TRIGL SERPL-MCNC: 55 MG/DL (ref 0–149)
VLDLC SERPL CALC-MCNC: 11 MG/DL (ref 5–40)
WBC # BLD AUTO: 9.5 X10E3/UL (ref 3.4–10.8)

## 2018-12-07 NOTE — PROGRESS NOTES
LDL cholesterol has increased slightly. Prefer to see this below 100. Work on a low saturated fate diet along with exercise and weight loss to lower this further. HgbA1C at 5.8 = pre-diabetes. Other labs are OK Continue your current medications

## 2018-12-31 DIAGNOSIS — I10 ESSENTIAL HYPERTENSION: ICD-10-CM

## 2018-12-31 NOTE — TELEPHONE ENCOUNTER
Medication refill request:    Last Office Visit:  11/29/2018  Next Office Visit:  No future appointments. Pharmacy verified. YES    Patient requesting 90 Day supply.

## 2019-01-03 RX ORDER — AMLODIPINE BESYLATE 5 MG/1
5 TABLET ORAL DAILY
Qty: 90 TAB | Refills: 3 | Status: SHIPPED | OUTPATIENT
Start: 2019-01-03 | End: 2020-01-28 | Stop reason: SDUPTHER

## 2019-01-16 ENCOUNTER — TELEPHONE (OUTPATIENT)
Dept: INTERNAL MEDICINE CLINIC | Age: 49
End: 2019-01-16

## 2019-01-16 NOTE — TELEPHONE ENCOUNTER
Patient stopped by office last week to inquire about diagnosis coding of 11/29/18 visit and lab work submission to insurance. Spoke to provider who updated diagnosis code. Claim resubmitted to insurance and diagnosis code updated with [facility] to be re billed. Left message for patient to call back.

## 2020-01-28 DIAGNOSIS — I10 ESSENTIAL HYPERTENSION: ICD-10-CM

## 2020-01-28 NOTE — TELEPHONE ENCOUNTER
----- Message from Yunior Bolton sent at 1/28/2020  2:58 PM EST -----  Regarding: ALEXANDR/REFILL  Contact: [phone]  Caller (if not patient): N/A  Relationship of caller (if not patient): N/A  Best contact number(s): [phone]  Name of medication and dosage if known: Amlodipine 5 mg  Is patient out of this medication (yes/no): Yes  Pharmacy name: [facility] listed in chart? (yes/no): Yes  Pharmacy phone number: N/A  Date of last visit: 12/05/2018  Details to clarify the request: Patient would a refill for Amlodipine 5 mg.

## 2020-01-28 NOTE — TELEPHONE ENCOUNTER
Pt hasnt been seen in over 1 year. Please call and schedule appt.   Will send rx request to provider for short term supply approval.  Thanks

## 2020-01-29 RX ORDER — AMLODIPINE BESYLATE 5 MG/1
5 TABLET ORAL DAILY
Qty: 90 TAB | Refills: 0 | Status: SHIPPED | OUTPATIENT
Start: 2020-01-29 | End: 2020-05-08 | Stop reason: SDUPTHER

## 2020-05-08 DIAGNOSIS — I10 ESSENTIAL HYPERTENSION: ICD-10-CM

## 2020-05-08 RX ORDER — AMLODIPINE BESYLATE 5 MG/1
5 TABLET ORAL DAILY
Qty: 90 TAB | Refills: 1 | Status: SHIPPED | OUTPATIENT
Start: 2020-05-08 | End: 2020-05-12 | Stop reason: SDUPTHER

## 2020-05-08 NOTE — TELEPHONE ENCOUNTER
Last visit 11/29/2018 MD Susan Hwang   Next appointment 05/12/2020 TORRES Gordon   Previous refill encounter(s) 1/29/2020 Norvasc #90     Requested Prescriptions     Pending Prescriptions Disp Refills    amLODIPine (NORVASC) 5 mg tablet 90 Tab 0     Sig: Take 1 Tab by mouth daily.

## 2020-05-12 ENCOUNTER — VIRTUAL VISIT (OUTPATIENT)
Dept: INTERNAL MEDICINE CLINIC | Age: 50
End: 2020-05-12

## 2020-05-12 VITALS — SYSTOLIC BLOOD PRESSURE: 121 MMHG | BODY MASS INDEX: 30.38 KG/M2 | DIASTOLIC BLOOD PRESSURE: 79 MMHG | WEIGHT: 194 LBS

## 2020-05-12 DIAGNOSIS — I10 ESSENTIAL HYPERTENSION: Primary | ICD-10-CM

## 2020-05-12 DIAGNOSIS — E78.5 HYPERLIPIDEMIA, UNSPECIFIED HYPERLIPIDEMIA TYPE: ICD-10-CM

## 2020-05-12 DIAGNOSIS — G47.33 OSA (OBSTRUCTIVE SLEEP APNEA): ICD-10-CM

## 2020-05-12 DIAGNOSIS — R73.02 IGT (IMPAIRED GLUCOSE TOLERANCE): ICD-10-CM

## 2020-05-12 RX ORDER — AMLODIPINE BESYLATE 5 MG/1
5 TABLET ORAL DAILY
Qty: 90 TAB | Refills: 1 | Status: SHIPPED | OUTPATIENT
Start: 2020-05-12 | End: 2021-02-01 | Stop reason: SDUPTHER

## 2020-05-12 RX ORDER — GLUCOSAMINE SULFATE 1500 MG
1000 POWDER IN PACKET (EA) ORAL DAILY
COMMUNITY

## 2020-05-12 NOTE — PROGRESS NOTES
[phone]      Chief Complaint   Patient presents with    Hypertension     f/u     1. Have you been to the ER, urgent care clinic since your last visit? Hospitalized since your last visit? No    2. Have you seen or consulted any other health care providers outside of the [address] [place] [address] since your last visit? Include any pap smears or colon screening.  No  Health Maintenance Due   Topic Date Due    Shingrix Vaccine Age 49> (1 of 2) 05/20/2019    FOBT Q1Y Age 54-65  05/20/2019     3 most recent PHQ Screens 5/12/2020   Little interest or pleasure in doing things Not at all   Feeling down, depressed, irritable, or hopeless Not at all   Total Score PHQ 2 0

## 2020-05-12 NOTE — PROGRESS NOTES
Subjective  Marlen Evans is a [address] y.o. male presents for routine visit   Consent: Marlen Evans, who was seen by synchronous (real-time) audio-video technology, and/or his healthcare decision maker, is aware that this patient-initiated, Telehealth encounter on 5/12/2020 is a billable service, with coverage as determined by his insurance carrier. He is aware that he may receive a bill and has provided verbal consent to proceed: Yes. HPI     He is compliant with medical management  Denies interim change in medical management or history   Denies hyperglycemia symptoms  Compliant with CPAP  No colonoscopy    Past Medical History:   Diagnosis Date    Family history of prostate cancer 11/13/2015    Hyperlipidemia     Hypertension     IGT (impaired glucose tolerance)     Wears glasses        Current Outpatient Medications   Medication Sig    cholecalciferol (Vitamin D3) 25 mcg (1,000 unit) cap Take 1,000 Units by mouth daily.  amLODIPine (NORVASC) 5 mg tablet Take 1 Tab by mouth daily.  ascorbic acid (VITAMIN C) 250 mg tablet Take  by mouth.  multivitamin (ONE A DAY) tablet Take 1 Tab by mouth daily.  loratadine (CLARITIN) 10 mg tablet Take 1 Tab by mouth daily. No current facility-administered medications for this visit. Allergies   Allergen Reactions    Lisinopril Cough    Losartan Other (comments)     Sleep disturbance     Review of Systems   Constitutional: Negative. Respiratory: Negative. Cardiovascular: Negative. Gastrointestinal: Negative. Genitourinary: Negative. Musculoskeletal: Negative. Neurological: Negative. Objective  Visit Vitals  /79 (BP 1 Location: Left arm, BP Patient Position: Sitting)   Wt 194 lb (88 kg)   BMI 30.38 kg/m²     Physical Exam     Assessment & Plan    ICD-10-CM ICD-9-CM    1. Essential hypertension I10 401.9 amLODIPine (NORVASC) 5 mg tablet   2. ANA (obstructive sleep apnea) G47.33 327.23    3.  Hyperlipidemia, unspecified hyperlipidemia type E78.5 272.4    4. IGT (impaired glucose tolerance) R73.02 790.22      Follow-up and Dispositions    · Return in about 2 months (around 7/12/2020) for diabetes, hyperlipidemia, htn, fasting labs. He declined lab referral   Instructed to discuss with PCP colon cancer screening on follow up   current treatment plan is effective, no change in therapy  lab results and schedule of future lab studies reviewed with patient  reviewed diet, exercise and weight control  specific diabetic recommendations: low cholesterol diet, weight control and daily exercise discussed and glycohemoglobin and other lab monitoring discussed     Patient voices understanding and acceptance of this advice and will call back if any further questions or concerns.   Earnest Harada, NP

## 2021-02-08 ENCOUNTER — VIRTUAL VISIT (OUTPATIENT)
Dept: INTERNAL MEDICINE CLINIC | Age: 51
End: 2021-02-08
Payer: COMMERCIAL

## 2021-02-08 DIAGNOSIS — E55.9 VITAMIN D DEFICIENCY: ICD-10-CM

## 2021-02-08 DIAGNOSIS — R73.02 IGT (IMPAIRED GLUCOSE TOLERANCE): ICD-10-CM

## 2021-02-08 DIAGNOSIS — Z12.11 COLON CANCER SCREENING: ICD-10-CM

## 2021-02-08 DIAGNOSIS — Z80.42 FAMILY HISTORY OF PROSTATE CANCER: ICD-10-CM

## 2021-02-08 DIAGNOSIS — I10 ESSENTIAL HYPERTENSION: Primary | ICD-10-CM

## 2021-02-08 DIAGNOSIS — E78.5 HYPERLIPIDEMIA, UNSPECIFIED HYPERLIPIDEMIA TYPE: ICD-10-CM

## 2021-02-08 PROCEDURE — 99214 OFFICE O/P EST MOD 30 MIN: CPT | Performed by: INTERNAL MEDICINE

## 2021-02-08 RX ORDER — AMLODIPINE BESYLATE 5 MG/1
5 TABLET ORAL DAILY
Qty: 90 TAB | Refills: 1 | Status: SHIPPED | OUTPATIENT
Start: 2021-02-08 | End: 2021-10-07

## 2021-02-08 NOTE — PROGRESS NOTES
Shama Agarwal (: 1970) is a 45 y.o. male, established patient, here for evaluation of the following chief complaint(s)--see below: Shama Agarwal is a 45 y.o. male who was seen by synchronous (real-time) audio-video technology on 2021. Consent: Shama Agarwal, who was seen by synchronous (real-time) audio-video technology, and/or his healthcare decision maker, is aware that this patient-initiated, Telehealth encounter on 2021 is a billable service, with coverage as determined by his insurance carrier. He is aware that he may receive a bill and has provided verbal consent to proceed: Yes. I was in the office while conducting this encounter. Assessment & Plan:   Diagnoses and all orders for this visit:      ICD-10-CM ICD-9-CM    1. Essential hypertension  I10 401.9 CBC WITH AUTOMATED DIFF      METABOLIC PANEL, COMPREHENSIVE      amLODIPine (NORVASC) 5 mg tablet   2. Hyperlipidemia, unspecified hyperlipidemia type  E78.5 272.4 LIPID PANEL      METABOLIC PANEL, COMPREHENSIVE   3. IGT (impaired glucose tolerance)  R73.02 790.22 HEMOGLOBIN A1C WITH EAG   4. Family history of prostate cancer  Z80.42 V16.42 PSA SCREENING (SCREENING)   5. Vitamin D deficiency  E55.9 268.9 VITAMIN D, 25 HYDROXY   6. Colon cancer screening  Z12.11 V76.51 REFERRAL TO GASTROENTEROLOGY     Follow-up and Dispositions    · Return in about 2 months (around 2021), or if symptoms worsen or fail to improve, for wellness visit. results and schedule of future studies reviewed with patient  reviewed diet, exercise and weight    cardiovascular risk and specific lipid/LDL goals reviewed  reviewed medications and side effects in detail    Pt to get COVID vaccine prior to getting labs  Labs ordered for lab. Continue current medications   Colon screening reviewed  Shingrix at f/u    AVS:  [x]  Available to patient in [employer] after visit signed. []  Mailed to patient after visit. []  Not sent to patient after visit. Subjective: Yousif Willams was seen for:  Chief Complaint   Patient presents with    Medication Refill     Notes:    Taking and tolerating    No CP, SOB, neuro sx    Doing well    Staying at home with pandemic. Nursing screenings reviewed by provider at visit. Past medical, Social, and Family history reviewed  Medications reviewed and updated. Allergies   Allergen Reactions    Lisinopril Cough    Losartan Other (comments)     Sleep disturbance       Prior to Admission medications    Medication Sig Start Date End Date Taking? Authorizing Provider   amLODIPine (NORVASC) 5 mg tablet Take 1 Tab by mouth daily. 2/1/21  Yes Leonel Arvizu MD   cholecalciferol (Vitamin D3) 25 mcg (1,000 unit) cap Take 1,000 Units by mouth daily. Yes Provider, Historical   multivitamin (ONE A DAY) tablet Take 1 Tab by mouth daily. Yes Provider, Historical   loratadine (CLARITIN) 10 mg tablet Take 1 Tab by mouth daily. 3/18/16  Yes Leonel Arvizu MD   ascorbic acid (VITAMIN C) 250 mg tablet Take  by mouth. Yes Provider, Historical         ROS     A complete ROS was performed and negative except as noted in HPI      PHYSICAL EXAMINATION:    Vital Signs: There were no vitals taken for this visit.     Patient-Reported Vitals 2/8/2021   Patient-Reported Systolic  261   Patient-Reported Diastolic 73        Constitutional: [x] Appears well-developed and well-nourished [x] No apparent distress      Mental status: [x] Alert and awake  [x] Oriented [x] Able to follow commands       Eyes:   EOM    [x]  Normal      Sclera  [x]  Normal              Discharge [x]  None visible       HENT: [x] Normocephalic, atraumatic    [x] Mouth/Throat: Mucous membranes are moist    External Ears [x] Normal      Neck: [x] No visualized mass     Pulmonary/Chest: [x] Respiratory effort normal   [x] No visualized signs of difficulty breathing or respiratory distress    Musculoskeletal:  [x] Normal range of motion of neck    Neurological: [x] No Facial Asymmetry (Cranial nerve 7 motor function) (limited exam due to video visit)          [x] No gaze palsy     Skin:        [x] No significant exanthematous lesions or discoloration noted on facial skin             Psychiatric:       [x] Normal Affect       Other pertinent observable physical exam findings:  None. We discussed the expected course, resolution and complications of the diagnosis(es) in detail. Medication risks, benefits, costs, interactions, and alternatives were discussed as indicated. I advised him to contact the office if his condition worsens, changes or fails to improve as anticipated. He expressed understanding with the diagnosis(es) and plan. Tim Vela is a 45 y.o. male who was evaluated by a video visit encounter for concerns as above. Tim Vela is being evaluated by a Virtual Visit (video visit) encounter to address concerns as mentioned above. A caregiver was present when appropriate. Due to this being a TeleHealth encounter (During XSWJH-11 public health emergency), evaluation of the following organ systems was limited: Vitals/Constitutional/EENT/Resp/CV/GI//MS/Neuro/Skin/Heme-Lymph-Imm. Pursuant to the emergency declaration under the [address] [place] [address] and the [employer] Act, this Virtual Visit was conducted with patient's (and/or legal guardian's) consent, to reduce the patient's risk of exposure to COVID-19 and provide necessary medical care. The patient (and/or legal guardian) has also been advised to contact this office for worsening conditions or problems, and seek emergency medical treatment and/or call 911 if deemed necessary. Patient identification was verified at the start of the visit: YES. Services were provided through a video synchronous discussion virtually to substitute for in-person clinic visit.  Patient was located at their individual home (or other location as per patient preference). Provider was located in medical office. An electronic signature was used to authenticate this note.   -- Lisa Hitchcock MD

## 2021-02-08 NOTE — PROGRESS NOTES
Virtual Visit    Chief Complaint   Patient presents with    Medication Refill       1. Have you been to the ER, urgent care clinic since your last visit? Hospitalized since your last visit? No    2. Have you seen or consulted any other health care providers outside of the [address] [place] [address] since your last visit? Include any pap smears or colon screening.  No    Health Maintenance Due   Topic Date Due    Shingrix Vaccine Age 49> (1 of 2) 05/20/2019    Colorectal Cancer Screening Combo  05/20/2019    Flu Vaccine (1) 09/01/2020       Learning Assessment 1/10/2018   PRIMARY LEARNER Patient   HIGHEST LEVEL OF EDUCATION - PRIMARY LEARNER  4 YEARS OF COLLEGE   BARRIERS PRIMARY LEARNER NONE   CO-LEARNER CAREGIVER No   PRIMARY LANGUAGE ENGLISH   LEARNER PREFERENCE PRIMARY VIDEOS   ANSWERED BY patient   RELATIONSHIP SELF

## 2021-08-03 PROBLEM — I10 HYPERTENSION: Status: RESOLVED | Noted: 2021-08-03 | Resolved: 2021-08-03

## 2021-10-07 DIAGNOSIS — I10 ESSENTIAL HYPERTENSION: ICD-10-CM

## 2021-10-07 RX ORDER — AMLODIPINE BESYLATE 5 MG/1
TABLET ORAL
Qty: 90 TABLET | Refills: 0 | Status: SHIPPED | OUTPATIENT
Start: 2021-10-07 | End: 2022-01-09

## 2022-01-08 DIAGNOSIS — I10 ESSENTIAL HYPERTENSION: ICD-10-CM

## 2022-01-09 RX ORDER — AMLODIPINE BESYLATE 5 MG/1
TABLET ORAL
Qty: 90 TABLET | Refills: 0 | Status: SHIPPED | OUTPATIENT
Start: 2022-01-09 | End: 2022-04-13

## 2022-01-17 NOTE — TELEPHONE ENCOUNTER
Writer left generic message to callback office,pt's medication was approved however Berthajazmine Jean Baptiste would like pt to make an IO appt for his Wellness Visit.

## 2022-04-13 DIAGNOSIS — I10 ESSENTIAL HYPERTENSION: ICD-10-CM

## 2022-04-13 RX ORDER — AMLODIPINE BESYLATE 5 MG/1
TABLET ORAL
Qty: 30 TABLET | Refills: 0 | Status: SHIPPED | OUTPATIENT
Start: 2022-04-13 | End: 2022-05-10

## 2022-05-10 DIAGNOSIS — I10 ESSENTIAL HYPERTENSION: ICD-10-CM

## 2022-05-10 RX ORDER — AMLODIPINE BESYLATE 5 MG/1
TABLET ORAL
Qty: 30 TABLET | Refills: 0 | Status: SHIPPED | OUTPATIENT
Start: 2022-05-10 | End: 2022-06-07

## 2022-06-05 DIAGNOSIS — I10 ESSENTIAL HYPERTENSION: ICD-10-CM

## 2022-06-07 RX ORDER — AMLODIPINE BESYLATE 5 MG/1
TABLET ORAL
Qty: 30 TABLET | Refills: 0 | Status: SHIPPED | OUTPATIENT
Start: 2022-06-07 | End: 2022-07-29

## 2022-07-08 DIAGNOSIS — I10 ESSENTIAL HYPERTENSION: ICD-10-CM

## 2022-07-29 RX ORDER — AMLODIPINE BESYLATE 5 MG/1
TABLET ORAL
Qty: 30 TABLET | Refills: 0 | Status: SHIPPED | OUTPATIENT
Start: 2022-07-29

## 2023-05-21 RX ORDER — AMLODIPINE BESYLATE 5 MG/1
1 TABLET ORAL DAILY
COMMUNITY
Start: 2022-07-29

## 2023-05-21 RX ORDER — ASCORBIC ACID 250 MG
TABLET ORAL
COMMUNITY

## 2023-05-21 RX ORDER — LORATADINE 10 MG/1
10 TABLET ORAL DAILY
COMMUNITY
Start: 2016-03-18